# Patient Record
Sex: FEMALE | Race: WHITE | NOT HISPANIC OR LATINO | Employment: FULL TIME | URBAN - METROPOLITAN AREA
[De-identification: names, ages, dates, MRNs, and addresses within clinical notes are randomized per-mention and may not be internally consistent; named-entity substitution may affect disease eponyms.]

---

## 2017-02-09 ENCOUNTER — OFFICE VISIT (OUTPATIENT)
Dept: ENDOCRINOLOGY | Age: 42
End: 2017-02-09

## 2017-02-09 VITALS
OXYGEN SATURATION: 98 % | WEIGHT: 198.2 LBS | HEIGHT: 68 IN | HEART RATE: 116 BPM | SYSTOLIC BLOOD PRESSURE: 126 MMHG | BODY MASS INDEX: 30.04 KG/M2 | DIASTOLIC BLOOD PRESSURE: 76 MMHG

## 2017-02-09 DIAGNOSIS — Z46.81 INSULIN PUMP TITRATION: ICD-10-CM

## 2017-02-09 DIAGNOSIS — IMO0002 DIABETES MELLITUS TYPE 1, UNCONTROLLED, WITH COMPLICATIONS: Primary | ICD-10-CM

## 2017-02-09 DIAGNOSIS — R63.5 ABNORMAL WEIGHT GAIN: ICD-10-CM

## 2017-02-09 DIAGNOSIS — E16.1 HYPERINSULINISM: ICD-10-CM

## 2017-02-09 PROBLEM — O24.919 DIABETES MELLITUS DURING PREGNANCY, ANTEPARTUM: Status: ACTIVE | Noted: 2017-02-09

## 2017-02-09 PROBLEM — E03.9 HYPOTHYROIDISM: Status: ACTIVE | Noted: 2017-02-09

## 2017-02-09 PROBLEM — E10.9 TYPE 1 DIABETES MELLITUS (HCC): Status: ACTIVE | Noted: 2017-02-09

## 2017-02-09 PROCEDURE — 99214 OFFICE O/P EST MOD 30 MIN: CPT | Performed by: INTERNAL MEDICINE

## 2017-02-09 NOTE — PROGRESS NOTES
41 y.o.    Patient Care Team:  Ray Barnes DO as PCP - General    Chief Complaint:      F/U TYPE 1 DIABETES, UNCONTROLLED.  HYPOTHYROIDISM.  NO RECENT LABS  Subjective     HPI   Patient is a 41-year-old white female with a past history of uncontrolled type 1 diabetes mellitus came for followup. After nearly 2 years.  Patient is currently using Medtronic insulin pump.  She uses NovoLog insulin.  Her blood sugars are fluctuating from  in the past few months.  Hypoglycemia.  Patient had several episodes of hypoglycemia.  She was unable to lose the CGM sensor effectively.  Hypothyroidism.  Patient is currently not on any medication. She was briefly on Synthroid during pregnancy    Interval History       Patient reports that at the sugars are fluctuating somewhere between . With an occasional high blood sugar. Since starting the sensor she believes that she has done very well able to monitor her blood sugars on the pump and adjust insulin.  she has not been using sensor for the past 2-3 months.  Hypoglycemia she has had a few episodes of hypoglycemia more so in the early morning hours and late-night.        Patient is currently using NovoLog was insulin pump  Her recent hemoglobin A1c was 7%     hypothyroidism  Patient reported that she is currently not taking Synthroid since discharge. She's not sure if she was given the medication during her recent hospitalization either.   patient reports that she managed to lose a little bit of weight. She is trying hard to lose weight. Her children are 18 months and 4 and half years old now         The following portions of the patient's history were reviewed and updated as appropriate: allergies, current medications, past family history, past medical history, past social history, past surgical history and problem list.    Past Medical History   Diagnosis Date   • Diabetes mellitus    • Hypoglycemia    • Insulin pump status    • Thyroiditis    • Type 1 diabetes  "mellitus      History reviewed. No pertinent family history.  Social History     Social History   • Marital status:      Spouse name: N/A   • Number of children: N/A   • Years of education: N/A     Occupational History   • Not on file.     Social History Main Topics   • Smoking status: Current Every Day Smoker   • Smokeless tobacco: Not on file   • Alcohol use Yes      Comment: social   • Drug use: Not on file   • Sexual activity: Not on file     Other Topics Concern   • Not on file     Social History Narrative     No Known Allergies    Current Outpatient Prescriptions:   •  PRATIMA CONTOUR NEXT TEST test strip, USE TO TEST 10 TIMES DAILY, Disp: 300 each, Rfl: 6  •  Blood Glucose Monitoring Suppl (PRATIMA CONTOUR NEXT EZ) W/DEVICE kit, , Disp: , Rfl:   •  escitalopram (LEXAPRO) 20 MG tablet, Take 20 mg by mouth., Disp: , Rfl:   •  insulin aspart (NOVOLOG) 100 UNIT/ML injection, Inject 120 Units under the skin Continuous. VIA INSULIN PUMP, Disp: 4 each, Rfl: 4        Review of Systems   Constitutional: Negative for chills, fatigue and fever.   Cardiovascular: Negative for chest pain and palpitations.   Gastrointestinal: Negative for abdominal pain, constipation, diarrhea, nausea and vomiting.   Endocrine: Negative for cold intolerance and heat intolerance.   All other systems reviewed and are negative.      Objective       Vitals:    02/09/17 1449   BP: 126/76   Pulse: 116   SpO2: 98%   Weight: 198 lb 3.2 oz (89.9 kg)   Height: 68\" (172.7 cm)     Body mass index is 30.14 kg/(m^2).      Physical Exam   Constitutional: She is oriented to person, place, and time. She appears well-developed and well-nourished.   HENT:   Head: Normocephalic and atraumatic.   Eyes: EOM are normal. Pupils are equal, round, and reactive to light.   Neck: Normal range of motion. Neck supple. No thyromegaly present.   Cardiovascular: Normal rate, regular rhythm, normal heart sounds and intact distal pulses.    Pulmonary/Chest: Effort normal " and breath sounds normal.   Abdominal: Soft. Bowel sounds are normal. She exhibits distension. There is no tenderness.   Musculoskeletal: Normal range of motion. She exhibits no edema.   Neurological: She is alert and oriented to person, place, and time.   Skin: Skin is warm and dry.   Nursing note and vitals reviewed.    Results Review:     I reviewed the patient's new clinical results.    Medical records reviewed  Summary:      Conversion Encounter on 06/30/2015   Component Date Value Ref Range Status   • Glucose 06/30/2015 400* 65 - 99 mg/dL Final   • BUN 06/30/2015 11  6 - 20 mg/dL Final   • Creatinine 06/30/2015 0.72  0.57 - 1.00 mg/dL Final   • eGFR Non African Am 06/30/2015 >60  mL/min/1.732 Final    Comment: GFR Normal                            >60  Chronic Kidney Disease          <60  Kidney Failure                         <15     • eGFR  Am 06/30/2015 >60  mL/min/1.732 Final    Comment: GFR Normal                            >60  Chronic Kidney Disease          <60  Kidney Failure                         <15     • BUN/Creatinine Ratio 06/30/2015 15   Final   • Sodium 06/30/2015 139  136 - 145 mmol/L Final   • Potassium 06/30/2015 5.0  3.5 - 5.2 mmol/L Final   • Chloride 06/30/2015 102  98 - 107 mmol/L Final   • Total CO2 06/30/2015 25  22 - 29 mmol/L Final   • Calcium 06/30/2015 9.0  8.6 - 10.5 mg/dL Final   • Total Protein 06/30/2015 7.0  6.0 - 8.5 g/dL Final   • Albumin 06/30/2015 4.4  3.5 - 5.2 g/dL Final   • Globulin 06/30/2015 2.6  g/dL Final   • A/G Ratio 06/30/2015 1.7   Final   • Total Bilirubin 06/30/2015 0.3  0.1 - 1.2 mg/dL Final   • Alkaline Phosphatase 06/30/2015 64  39 - 117 U/L Final   • AST (SGOT) 06/30/2015 13  5 - 32 U/L Final   • ALT (SGPT) 06/30/2015 12  5 - 33 U/L Final   • Creatinine, Urine 06/30/2015 115.0  16.0 - 327.0 mg/dL Final   • Microalbumin, Urine 06/30/2015 3.2  0.0 - 17.0 ug/mL Final   • Microalbumin/Creatinine Ratio Urine 06/30/2015 2.8  0.0 - 30.0 mg/g creat Final    • Hemoglobin A1C 06/30/2015 8.4* 4.8 - 5.6 % Final    Comment:    Hemoglobin A1C Ranges:     Increased Risk for Diabetes    5.7% to 6.4%     Diabetes                                 >=  6.5%     Diabetic Goal                          < 7.0%     • Free T4 06/30/2015 1.15  0.93 - 1.70 ng/dL Final   • TSH 06/30/2015 2.60  0.27 - 4.20 uIU/mL Final     Lab Results   Component Value Date    HGBA1C 8.4 (H) 06/30/2015     Lab Results   Component Value Date    MICROALBUR 3.2 06/30/2015    CREATININE 0.72 06/30/2015     Imaging Results (most recent)     None                Assessment and Plan:    Meredith was seen today for hypothyroidism and diabetes.    Diagnoses and all orders for this visit:    Diabetes mellitus type 1, uncontrolled, with complications  -     Comprehensive Metabolic Panel  -     T4, Free  -     TSH  -     Microalbumin / Creatinine Urine Ratio    Hyperinsulinism  -     Comprehensive Metabolic Panel  -     T4, Free  -     TSH  -     Microalbumin / Creatinine Urine Ratio    Insulin pump titration  -     Comprehensive Metabolic Panel  -     T4, Free  -     TSH  -     Microalbumin / Creatinine Urine Ratio    Abnormal weight gain  -     Comprehensive Metabolic Panel  -     T4, Free  -     TSH  -     Microalbumin / Creatinine Urine Ratio      #1 uncontrolled type 1 diabetes mellitus. Patient is using NovoLog via Medtronic pump. Blood sugars are fluctuating between  range.   Pump download reviewed with the patient.     Most blood sugars are ranging from .  Both the basal and bolus rates adjusted to get the blood sugars more streamlined.  Also to prevent hypoglycemia  #2 hypoglycemia: A few episodes in the 50 range at different times of the day- but mostly in the early morning hours and late-night.  #3 insulin pump management;basal rates were changed as well as bolus rates.  #4 abnormal weight gain  She managed to gain nearly 15 lbs  #5 history of primary hypothyroidism. Currently not on  medication     Patient will relapse testing done today.  I encouraged her to consider her diet and weight loss and exercise.  She'll return to follow up in 3 months    The total time spent for old record and lab review and face- to- face was more than 25 min of which greater than 50% of time was spent on counseling the patient on recommended evaluation and treatment options, instructions for management/treatment and /or follow up  and importance of compliance with chosen management or treatment options    Servando Pablo MD. FACE    02/09/17      EMR Dragon / transcription disclaimer:    Much of this encounter note is an electronic transcription/ translation of spoken language to printed text.  Electronic translation of spoken language may permit erroneous, or at times, nonsensical words or phrases to be inadvertently transcribed; although I have reviewed the note for such errors, some may still exist.

## 2017-02-10 LAB
ALBUMIN SERPL-MCNC: 4.3 G/DL (ref 3.5–5.2)
ALBUMIN/CREAT UR: <3.6 MG/G CREAT (ref 0–30)
ALBUMIN/GLOB SERPL: 1.5 G/DL
ALP SERPL-CCNC: 67 U/L (ref 39–117)
ALT SERPL-CCNC: 13 U/L (ref 1–33)
AST SERPL-CCNC: 14 U/L (ref 1–32)
BILIRUB SERPL-MCNC: 0.2 MG/DL (ref 0.1–1.2)
BUN SERPL-MCNC: 13 MG/DL (ref 6–20)
BUN/CREAT SERPL: 16.9 (ref 7–25)
CALCIUM SERPL-MCNC: 9.5 MG/DL (ref 8.6–10.5)
CHLORIDE SERPL-SCNC: 100 MMOL/L (ref 98–107)
CO2 SERPL-SCNC: 27.9 MMOL/L (ref 22–29)
CREAT SERPL-MCNC: 0.77 MG/DL (ref 0.57–1)
CREAT UR-MCNC: 82.8 MG/DL
GLOBULIN SER CALC-MCNC: 2.8 GM/DL
GLUCOSE SERPL-MCNC: 282 MG/DL (ref 65–99)
MICROALBUMIN UR-MCNC: <3 UG/ML
POTASSIUM SERPL-SCNC: 4.4 MMOL/L (ref 3.5–5.2)
PROT SERPL-MCNC: 7.1 G/DL (ref 6–8.5)
SODIUM SERPL-SCNC: 139 MMOL/L (ref 136–145)
T4 FREE SERPL-MCNC: 0.97 NG/DL (ref 0.93–1.7)
TSH SERPL DL<=0.005 MIU/L-ACNC: 2.58 MIU/ML (ref 0.27–4.2)

## 2017-07-07 ENCOUNTER — OFFICE VISIT (OUTPATIENT)
Dept: ENDOCRINOLOGY | Age: 42
End: 2017-07-07

## 2017-07-07 VITALS
HEIGHT: 68 IN | SYSTOLIC BLOOD PRESSURE: 132 MMHG | HEART RATE: 82 BPM | OXYGEN SATURATION: 98 % | WEIGHT: 199.2 LBS | DIASTOLIC BLOOD PRESSURE: 92 MMHG | BODY MASS INDEX: 30.19 KG/M2

## 2017-07-07 DIAGNOSIS — E16.1 HYPERINSULINISM: ICD-10-CM

## 2017-07-07 DIAGNOSIS — E03.9 HYPOTHYROIDISM, UNSPECIFIED TYPE: ICD-10-CM

## 2017-07-07 DIAGNOSIS — R63.5 ABNORMAL WEIGHT GAIN: ICD-10-CM

## 2017-07-07 DIAGNOSIS — Z46.81 INSULIN PUMP TITRATION: ICD-10-CM

## 2017-07-07 DIAGNOSIS — IMO0002 DIABETES MELLITUS TYPE 1, UNCONTROLLED, WITH COMPLICATIONS: Primary | ICD-10-CM

## 2017-07-07 PROCEDURE — 99214 OFFICE O/P EST MOD 30 MIN: CPT | Performed by: INTERNAL MEDICINE

## 2017-07-07 NOTE — PROGRESS NOTES
41 y.o.    Patient Care Team:  Ray Barnes DO as PCP - General    Chief Complaint:      F/U TYPE 1 DIABETES, UNCONTROLLED.  HYPOTHYROIDISM.  NO RECENT LABS  Subjective     HPI patient is a 41-year-old white female with a past history of uncontrolled type 1 diabetes mellitus came for follow-up.  Patient is currently using Medtronic pump  Blood sugars are fluctuating from   She reports a lot of stress at home and has not been able to focus on her diabetes management  Hypoglycemia  Patient had several episodes of hypoglycemia in the past several months.  She's currently not using the CGM  Hypothyroidism  Patient is currently not on medication.  She was on Synthroid briefly during her pregnancy  Patient denies any symptoms suggestive of diabetic neuropathy or retinopathy or nephropathy.  Abnormal weight gain  Patient currently weighs 199 pounds with a BMI of 30.3       Interval History         Patient reports that at the sugars are fluctuating somewhere between . With an occasional high blood sugar. Since starting the sensor she believes that she has done very well able to monitor her blood sugars on the pump and adjust insulin.  she has not been using sensor for the past 2-3 months.  Hypoglycemia she has had a few episodes of hypoglycemia more so in the early morning hours and late-night.          Patient is currently using NovoLog was insulin pump  Her recent hemoglobin A1c was 7%      hypothyroidism  Patient reported that she is currently not taking Synthroid since discharge. She's not sure if she was given the medication during her recent hospitalization either.   patient reports that she managed to lose a little bit of weight. She is trying hard to lose weight. Her children are 18 months and 4 and half years old now   The following portions of the patient's history were reviewed and updated as appropriate: allergies, current medications, past family history, past medical history, past social  "history, past surgical history and problem list.    Past Medical History:   Diagnosis Date   • Diabetes mellitus    • Hypoglycemia    • Insulin pump status    • Thyroiditis    • Type 1 diabetes mellitus      No family history on file.  Social History     Social History   • Marital status:      Spouse name: N/A   • Number of children: N/A   • Years of education: N/A     Occupational History   • Not on file.     Social History Main Topics   • Smoking status: Current Every Day Smoker   • Smokeless tobacco: Not on file   • Alcohol use Yes      Comment: social   • Drug use: Not on file   • Sexual activity: Not on file     Other Topics Concern   • Not on file     Social History Narrative     No Known Allergies    Current Outpatient Prescriptions:   •  PRATIMA CONTOUR NEXT TEST test strip, USE TO TEST 10 TIMES DAILY, Disp: 300 each, Rfl: 6  •  Blood Glucose Monitoring Suppl (PRATIMA CONTOUR NEXT EZ) W/DEVICE kit, , Disp: , Rfl:   •  escitalopram (LEXAPRO) 20 MG tablet, Take 20 mg by mouth., Disp: , Rfl:   •  insulin aspart (NOVOLOG) 100 UNIT/ML injection, Inject 120 Units under the skin Continuous. VIA INSULIN PUMP, Disp: 4 each, Rfl: 4        Review of Systems   Constitutional: Negative for chills, fatigue and fever.   Cardiovascular: Negative for chest pain and palpitations.   Gastrointestinal: Negative for abdominal pain, constipation, diarrhea, nausea and vomiting.   Endocrine: Negative for cold intolerance and heat intolerance.   All other systems reviewed and are negative.      Objective       Vitals:    07/07/17 0957   BP: 132/92   Pulse: 82   SpO2: 98%   Weight: 199 lb 3.2 oz (90.4 kg)   Height: 68\" (172.7 cm)     Body mass index is 30.29 kg/(m^2).      Physical Exam   Constitutional: She is oriented to person, place, and time. She appears well-developed and well-nourished.   HENT:   Head: Normocephalic and atraumatic.   Eyes: EOM are normal. Pupils are equal, round, and reactive to light.   Neck: Normal range " of motion. Neck supple. No thyromegaly present.   Cardiovascular: Normal rate, regular rhythm, normal heart sounds and intact distal pulses.    Pulmonary/Chest: Effort normal and breath sounds normal.   Abdominal: Soft. Bowel sounds are normal. She exhibits distension. There is no tenderness.   Musculoskeletal: Normal range of motion. She exhibits no edema.   Neurological: She is alert and oriented to person, place, and time.   Skin: Skin is warm and dry.   Nursing note and vitals reviewed.    Results Review:     I reviewed the patient's new clinical results.    Medical records reviewed  Summary:      Office Visit on 02/09/2017   Component Date Value Ref Range Status   • Glucose 02/09/2017 282* 65 - 99 mg/dL Final   • BUN 02/09/2017 13  6 - 20 mg/dL Final   • Creatinine 02/09/2017 0.77  0.57 - 1.00 mg/dL Final   • eGFR Non  Am 02/09/2017 83  >60 mL/min/1.73 Final   • eGFR African Am 02/09/2017 100  >60 mL/min/1.73 Final   • BUN/Creatinine Ratio 02/09/2017 16.9  7.0 - 25.0 Final   • Sodium 02/09/2017 139  136 - 145 mmol/L Final   • Potassium 02/09/2017 4.4  3.5 - 5.2 mmol/L Final   • Chloride 02/09/2017 100  98 - 107 mmol/L Final   • Total CO2 02/09/2017 27.9  22.0 - 29.0 mmol/L Final   • Calcium 02/09/2017 9.5  8.6 - 10.5 mg/dL Final   • Total Protein 02/09/2017 7.1  6.0 - 8.5 g/dL Final   • Albumin 02/09/2017 4.30  3.50 - 5.20 g/dL Final   • Globulin 02/09/2017 2.8  gm/dL Final   • A/G Ratio 02/09/2017 1.5  g/dL Final   • Total Bilirubin 02/09/2017 0.2  0.1 - 1.2 mg/dL Final   • Alkaline Phosphatase 02/09/2017 67  39 - 117 U/L Final   • AST (SGOT) 02/09/2017 14  1 - 32 U/L Final   • ALT (SGPT) 02/09/2017 13  1 - 33 U/L Final   • Free T4 02/09/2017 0.97  0.93 - 1.70 ng/dL Final   • TSH 02/09/2017 2.580  0.270 - 4.200 mIU/mL Final   • Creatinine, Urine 02/09/2017 82.8  Not Estab. mg/dL Final   • Microalbumin, Urine 02/09/2017 <3.0  Not Estab. ug/mL Final   • Microalbumin/Creatinine Ratio Urine 02/09/2017 <3.6   0.0 - 30.0 mg/g creat Final     Lab Results   Component Value Date    HGBA1C 8.4 (H) 06/30/2015     Lab Results   Component Value Date    MICROALBUR <3.0 02/09/2017    CREATININE 0.77 02/09/2017     Imaging Results (most recent)     None                Assessment and Plan:    Meredith was seen today for hypothyroidism and diabetes.    Diagnoses and all orders for this visit:    Diabetes mellitus type 1, uncontrolled, with complications  -     Comprehensive Metabolic Panel  -     Hemoglobin A1c  -     T4, Free  -     TSH  -     Microalbumin / Creatinine Urine Ratio    Hypothyroidism, unspecified type  -     Comprehensive Metabolic Panel  -     Hemoglobin A1c  -     T4, Free  -     TSH  -     Microalbumin / Creatinine Urine Ratio    Insulin pump titration    Abnormal weight gain    Hyperinsulinism    Other orders  -     insulin aspart (NOVOLOG) 100 UNIT/ML injection; Inject 120 Units under the skin Continuous. VIA INSULIN PUMP           #1 uncontrolled type 1 diabetes mellitus.Also to prevent hypoglycemia  #2 hypoglycemia:   #3 insulin pump management;basal rates were changed as well as bolus rates.  #4 abnormal weight gain    #5 history of primary hypothyroidism. Currently not on medication      Pump download reviewed with the patient  Patient is using mostly manual boluses for correction as well as mealtime  Her blood sugars are in the 150-350 range  No episodes of hypoglycemia noted so far in the past one month    Patient appears to have lost motivation and she's not focusing on carb counting and mealtime boluses  Patient will get lab testing done today  I encouraged her to consider writing down blood sugars and using log sheets once again  I also encouraged her to consider an upgrade to 670 G with CGM    Patient will return to follow-up in 3 months.  The total time spent for old record and lab review and face- to- face was more than 25 min of which greater than 15 min of time was spent on counseling the patient on  recommended evaluation and treatment options, instructions for management/treatment and /or follow up  and importance of compliance with chosen management or treatment options       Servando Pablo MD. FACE    07/07/17

## 2017-07-08 LAB
ALBUMIN SERPL-MCNC: 4.7 G/DL (ref 3.5–5.2)
ALBUMIN/CREAT UR: <32.6 MG/G CREAT (ref 0–30)
ALBUMIN/GLOB SERPL: 1.6 G/DL
ALP SERPL-CCNC: 73 U/L (ref 39–117)
ALT SERPL-CCNC: 23 U/L (ref 1–33)
AST SERPL-CCNC: 21 U/L (ref 1–32)
BILIRUB SERPL-MCNC: 0.3 MG/DL (ref 0.1–1.2)
BUN SERPL-MCNC: 9 MG/DL (ref 6–20)
BUN/CREAT SERPL: 11.5 (ref 7–25)
CALCIUM SERPL-MCNC: 9.8 MG/DL (ref 8.6–10.5)
CHLORIDE SERPL-SCNC: 101 MMOL/L (ref 98–107)
CO2 SERPL-SCNC: 28.1 MMOL/L (ref 22–29)
CREAT SERPL-MCNC: 0.78 MG/DL (ref 0.57–1)
CREAT UR-MCNC: 9.2 MG/DL
GLOBULIN SER CALC-MCNC: 3 GM/DL
GLUCOSE SERPL-MCNC: 118 MG/DL (ref 65–99)
HBA1C MFR BLD: 9.7 % (ref 4.8–5.6)
MICROALBUMIN UR-MCNC: <3 UG/ML
POTASSIUM SERPL-SCNC: 4 MMOL/L (ref 3.5–5.2)
PROT SERPL-MCNC: 7.7 G/DL (ref 6–8.5)
SODIUM SERPL-SCNC: 143 MMOL/L (ref 136–145)
T4 FREE SERPL-MCNC: 1.2 NG/DL (ref 0.93–1.7)
TSH SERPL DL<=0.005 MIU/L-ACNC: 2.72 MIU/ML (ref 0.27–4.2)

## 2017-10-16 ENCOUNTER — OFFICE VISIT (OUTPATIENT)
Dept: ENDOCRINOLOGY | Age: 42
End: 2017-10-16

## 2017-10-16 VITALS
HEIGHT: 68 IN | BODY MASS INDEX: 30.65 KG/M2 | HEART RATE: 100 BPM | SYSTOLIC BLOOD PRESSURE: 118 MMHG | DIASTOLIC BLOOD PRESSURE: 74 MMHG | WEIGHT: 202.2 LBS

## 2017-10-16 DIAGNOSIS — E16.1 HYPERINSULINISM: ICD-10-CM

## 2017-10-16 DIAGNOSIS — E03.9 HYPOTHYROIDISM, UNSPECIFIED TYPE: ICD-10-CM

## 2017-10-16 DIAGNOSIS — R63.5 ABNORMAL WEIGHT GAIN: ICD-10-CM

## 2017-10-16 DIAGNOSIS — Z46.81 INSULIN PUMP TITRATION: ICD-10-CM

## 2017-10-16 DIAGNOSIS — IMO0002 DIABETES MELLITUS TYPE 1, UNCONTROLLED, WITH COMPLICATIONS: Primary | ICD-10-CM

## 2017-10-16 PROCEDURE — 99214 OFFICE O/P EST MOD 30 MIN: CPT | Performed by: INTERNAL MEDICINE

## 2017-10-16 NOTE — PROGRESS NOTES
42 y.o.    Patient Care Team:  Ray Barnes DO as PCP - General    Chief Complaint:      F/U TYPE 1 DIABETES, UNCONTROLLED.  NO RECENT LABS.    Subjective     HPI   \Patient is a 42-year-old white female with a past history of uncontrolled type 1 diabetes mellitus came for follow-up  Patient is currently using Medtronic pump  She uses NovoLog via the pump  She reports that most of the blood sugars are in the 100-400 range  She continues to report a lot of stress at home and has not been able to focus on diabetes management but she has at least tried to check more often in the past one month  Hypoglycemia  Patient has not had any hypoglycemia in the past few months  Hypothyroidism  Patient has history of hypothyroidism and is currently not on medication.  She used to be on medication during her pregnancy  Abnormal weight gain  Patient currently weighs 202 pounds with a BMI of 30.8.    Patient denies any symptoms suggestive of diabetic neuropathy or retinopathy or nephropathy.       Interval History          Patient reports that at the sugars are fluctuating somewhere between . With an occasional high blood sugar. Since starting the sensor she believes that she has done very well able to monitor her blood sugars on the pump and adjust insulin.  she has not been using sensor for the past 2-3 months.  Hypoglycemia she has had a few episodes of hypoglycemia more so in the early morning hours and late-night.          Patient is currently using NovoLog was insulin pump  Her recent hemoglobin A1c was 7%      hypothyroidism  Patient reported that she is currently not taking Synthroid since discharge. She's not sure if she was given the medication during her recent hospitalization either.   patient reports that she managed to lose a little bit of weight. She is trying hard to lose weight. Her children are 18 months and 4 and half years old now   The following portions of the patient's history were reviewed and  "updated as appropriate: allergies, current medications, past family history, past medical history, past social history, past surgical history and problem list.    Past Medical History:   Diagnosis Date   • Diabetes mellitus    • Hypoglycemia    • Insulin pump status    • Thyroiditis    • Type 1 diabetes mellitus      History reviewed. No pertinent family history.  Social History     Social History   • Marital status:      Spouse name: N/A   • Number of children: N/A   • Years of education: N/A     Occupational History   • Not on file.     Social History Main Topics   • Smoking status: Current Every Day Smoker   • Smokeless tobacco: Not on file   • Alcohol use Yes      Comment: social   • Drug use: Not on file   • Sexual activity: Not on file     Other Topics Concern   • Not on file     Social History Narrative     No Known Allergies    Current Outpatient Prescriptions:   •  PRATIMA CONTOUR NEXT TEST test strip, USE TO TEST 10 TIMES DAILY, Disp: 300 each, Rfl: 5  •  Blood Glucose Monitoring Suppl (PRATIMA CONTOUR NEXT EZ) W/DEVICE kit, , Disp: , Rfl:   •  escitalopram (LEXAPRO) 20 MG tablet, Take 20 mg by mouth., Disp: , Rfl:   •  insulin aspart (NOVOLOG) 100 UNIT/ML injection, Inject 120 Units under the skin Continuous. VIA INSULIN PUMP, Disp: 4 each, Rfl: 4        Review of Systems   Constitutional: Negative for chills, fatigue and fever.   Cardiovascular: Negative for chest pain and palpitations.   Gastrointestinal: Negative for abdominal pain, constipation, diarrhea and vomiting.   Endocrine: Negative for cold intolerance and heat intolerance.   All other systems reviewed and are negative.      Objective       Vitals:    10/16/17 1605   BP: 118/74   Pulse: 100   Weight: 202 lb 3.2 oz (91.7 kg)   Height: 68\" (172.7 cm)     Body mass index is 30.74 kg/(m^2).      Physical Exam   Constitutional: She is oriented to person, place, and time. She appears well-developed and well-nourished.   HENT:   Head: " Normocephalic and atraumatic.   Eyes: EOM are normal. Pupils are equal, round, and reactive to light.   Neck: Normal range of motion. Neck supple. No thyromegaly present.   Cardiovascular: Normal rate, regular rhythm, normal heart sounds and intact distal pulses.    Pulmonary/Chest: Effort normal and breath sounds normal.   Abdominal: Soft. Bowel sounds are normal. She exhibits distension. There is no tenderness.   Musculoskeletal: Normal range of motion. She exhibits no edema.   Neurological: She is alert and oriented to person, place, and time.   Skin: Skin is warm and dry.   Nursing note and vitals reviewed.    Results Review:     I reviewed the patient's new clinical results.    Medical records reviewed  Summary:      Office Visit on 07/07/2017   Component Date Value Ref Range Status   • Glucose 07/07/2017 118* 65 - 99 mg/dL Final   • BUN 07/07/2017 9  6 - 20 mg/dL Final   • Creatinine 07/07/2017 0.78  0.57 - 1.00 mg/dL Final   • eGFR Non  Am 07/07/2017 81  >60 mL/min/1.73 Final   • eGFR African Am 07/07/2017 99  >60 mL/min/1.73 Final   • BUN/Creatinine Ratio 07/07/2017 11.5  7.0 - 25.0 Final   • Sodium 07/07/2017 143  136 - 145 mmol/L Final   • Potassium 07/07/2017 4.0  3.5 - 5.2 mmol/L Final   • Chloride 07/07/2017 101  98 - 107 mmol/L Final   • Total CO2 07/07/2017 28.1  22.0 - 29.0 mmol/L Final   • Calcium 07/07/2017 9.8  8.6 - 10.5 mg/dL Final   • Total Protein 07/07/2017 7.7  6.0 - 8.5 g/dL Final   • Albumin 07/07/2017 4.70  3.50 - 5.20 g/dL Final   • Globulin 07/07/2017 3.0  gm/dL Final   • A/G Ratio 07/07/2017 1.6  g/dL Final   • Total Bilirubin 07/07/2017 0.3  0.1 - 1.2 mg/dL Final   • Alkaline Phosphatase 07/07/2017 73  39 - 117 U/L Final   • AST (SGOT) 07/07/2017 21  1 - 32 U/L Final   • ALT (SGPT) 07/07/2017 23  1 - 33 U/L Final   • Hemoglobin A1C 07/07/2017 9.70* 4.80 - 5.60 % Final    Comment: Hemoglobin A1C Ranges:  Increased Risk for Diabetes  5.7% to 6.4%  Diabetes                     >=  6.5%  Diabetic Goal                < 7.0%     • Free T4 07/07/2017 1.20  0.93 - 1.70 ng/dL Final   • TSH 07/07/2017 2.720  0.270 - 4.200 mIU/mL Final   • Creatinine, Urine 07/07/2017 9.2  Not Estab. mg/dL Final   • Microalbumin, Urine 07/07/2017 <3.0  Not Estab. ug/mL Final   • Microalbumin/Creatinine Ratio Urine 07/07/2017 <32.6* 0.0 - 30.0 mg/g creat Final     Lab Results   Component Value Date    HGBA1C 9.70 (H) 07/07/2017    HGBA1C 8.4 (H) 06/30/2015     Lab Results   Component Value Date    MICROALBUR <3.0 07/07/2017    CREATININE 0.78 07/07/2017     Imaging Results (most recent)     None                Assessment and Plan:    Meredith was seen today for diabetes.    Diagnoses and all orders for this visit:    Diabetes mellitus type 1, uncontrolled, with complications  -     Comprehensive Metabolic Panel  -     Hemoglobin A1c  -     T4, Free  -     TSH  -     Microalbumin / Creatinine Urine Ratio - Urine, Clean Catch    Hypothyroidism, unspecified type  -     Comprehensive Metabolic Panel  -     Hemoglobin A1c  -     T4, Free  -     TSH  -     Microalbumin / Creatinine Urine Ratio - Urine, Clean Catch    Hyperinsulinism    Insulin pump titration    Abnormal weight gain         #1 uncontrolled type 1 diabetes mellitus.Also to prevent hypoglycemia  #2 hypoglycemia:   #3 insulin pump management;basal rates were changed as well as bolus rates.  #4 abnormal weight gain     #5 history of primary hypothyroidism. Currently not on medication      Pump download reviewed  Most of the blood sugars are in the 150-350 range  No recent hypoglycemia noted   basal rates increased as well as bolus at breakfast    Patient is receiving her Medtronic 670 G pump in a month  Patient will get lab testing done today  Patient will return to follow-up in January as scheduled    The total time spent for old record and lab review and face- to- face was more than 25 min of which greater than 15 min of time was spent on counseling the patient on  "recommended evaluation and treatment options, instructions for management/treatment and /or follow up  and importance of compliance with chosen management or treatment options  Servando Pablo MD. FACE    10/16/17      EMR Dragon / transcription disclaimer:     \"Dictated utilizing Dragon dictation\".         "

## 2017-10-17 LAB
ALBUMIN SERPL-MCNC: 4.2 G/DL (ref 3.5–5.2)
ALBUMIN/CREAT UR: 3.4 MG/G CREAT (ref 0–30)
ALBUMIN/GLOB SERPL: 1.4 G/DL
ALP SERPL-CCNC: 71 U/L (ref 39–117)
ALT SERPL-CCNC: 12 U/L (ref 1–33)
AST SERPL-CCNC: 15 U/L (ref 1–32)
BILIRUB SERPL-MCNC: 0.2 MG/DL (ref 0.1–1.2)
BUN SERPL-MCNC: 13 MG/DL (ref 6–20)
BUN/CREAT SERPL: 15.9 (ref 7–25)
CALCIUM SERPL-MCNC: 9.2 MG/DL (ref 8.6–10.5)
CHLORIDE SERPL-SCNC: 103 MMOL/L (ref 98–107)
CO2 SERPL-SCNC: 25.5 MMOL/L (ref 22–29)
CREAT SERPL-MCNC: 0.82 MG/DL (ref 0.57–1)
CREAT UR-MCNC: 233.4 MG/DL
GLOBULIN SER CALC-MCNC: 3 GM/DL
GLUCOSE SERPL-MCNC: 150 MG/DL (ref 65–99)
HBA1C MFR BLD: 9.4 % (ref 4.8–5.6)
MICROALBUMIN UR-MCNC: 7.9 UG/ML
POTASSIUM SERPL-SCNC: 3.9 MMOL/L (ref 3.5–5.2)
PROT SERPL-MCNC: 7.2 G/DL (ref 6–8.5)
SODIUM SERPL-SCNC: 140 MMOL/L (ref 136–145)
T4 FREE SERPL-MCNC: 1.03 NG/DL (ref 0.93–1.7)
TSH SERPL DL<=0.005 MIU/L-ACNC: 2.37 MIU/ML (ref 0.27–4.2)

## 2018-05-01 ENCOUNTER — OFFICE VISIT (OUTPATIENT)
Dept: ENDOCRINOLOGY | Age: 43
End: 2018-05-01

## 2018-05-01 VITALS
DIASTOLIC BLOOD PRESSURE: 70 MMHG | HEIGHT: 68 IN | BODY MASS INDEX: 31.22 KG/M2 | SYSTOLIC BLOOD PRESSURE: 118 MMHG | WEIGHT: 206 LBS | HEART RATE: 94 BPM

## 2018-05-01 DIAGNOSIS — Z46.81 INSULIN PUMP TITRATION: ICD-10-CM

## 2018-05-01 DIAGNOSIS — IMO0002 DIABETES MELLITUS TYPE 1, UNCONTROLLED, WITH COMPLICATIONS: Primary | ICD-10-CM

## 2018-05-01 DIAGNOSIS — R63.5 ABNORMAL WEIGHT GAIN: ICD-10-CM

## 2018-05-01 DIAGNOSIS — E16.1 HYPERINSULINISM: ICD-10-CM

## 2018-05-01 PROCEDURE — 99214 OFFICE O/P EST MOD 30 MIN: CPT | Performed by: INTERNAL MEDICINE

## 2018-05-01 RX ORDER — LANOLIN ALCOHOL/MO/W.PET/CERES
325 CREAM (GRAM) TOPICAL
COMMUNITY
Start: 2018-04-13 | End: 2019-01-11

## 2018-05-01 RX ORDER — MELOXICAM 15 MG/1
TABLET ORAL
COMMUNITY
Start: 2018-04-13 | End: 2019-01-11

## 2018-05-01 RX ORDER — CYANOCOBALAMIN 1000 UG/ML
1000 INJECTION, SOLUTION INTRAMUSCULAR; SUBCUTANEOUS
COMMUNITY
Start: 2018-05-01 | End: 2018-05-01

## 2018-08-23 DIAGNOSIS — IMO0002 DIABETES MELLITUS TYPE 1, UNCONTROLLED, WITH COMPLICATIONS: Primary | ICD-10-CM

## 2018-10-12 ENCOUNTER — RESULTS ENCOUNTER (OUTPATIENT)
Dept: ENDOCRINOLOGY | Age: 43
End: 2018-10-12

## 2018-10-12 DIAGNOSIS — IMO0002 DIABETES MELLITUS TYPE 1, UNCONTROLLED, WITH COMPLICATIONS: ICD-10-CM

## 2019-01-11 ENCOUNTER — OFFICE VISIT (OUTPATIENT)
Dept: ENDOCRINOLOGY | Age: 44
End: 2019-01-11

## 2019-01-11 VITALS
WEIGHT: 204.4 LBS | DIASTOLIC BLOOD PRESSURE: 82 MMHG | SYSTOLIC BLOOD PRESSURE: 122 MMHG | BODY MASS INDEX: 30.98 KG/M2 | HEIGHT: 68 IN | HEART RATE: 91 BPM

## 2019-01-11 DIAGNOSIS — Z46.81 INSULIN PUMP TITRATION: ICD-10-CM

## 2019-01-11 DIAGNOSIS — R63.5 ABNORMAL WEIGHT GAIN: ICD-10-CM

## 2019-01-11 DIAGNOSIS — E16.1 HYPERINSULINISM: ICD-10-CM

## 2019-01-11 DIAGNOSIS — IMO0002 DIABETES MELLITUS TYPE 1, UNCONTROLLED, WITH COMPLICATIONS: Primary | ICD-10-CM

## 2019-01-11 PROCEDURE — 99214 OFFICE O/P EST MOD 30 MIN: CPT | Performed by: INTERNAL MEDICINE

## 2019-01-11 RX ORDER — BLOOD SUGAR DIAGNOSTIC
STRIP MISCELLANEOUS
Qty: 100 EACH | Refills: 1 | Status: SHIPPED | OUTPATIENT
Start: 2019-01-11

## 2019-01-11 NOTE — PROGRESS NOTES
43 y.o.    Patient Care Team:  Ray Barnes DO as PCP - General    Chief Complaint:      F/U TYPE 1 DIABETES, UNCONTROLLED.  NO RECENT LABS.  Subjective     HPI   Patient is a 43-year-old white female with a past history of uncontrolled type 1 diabetes mellitus came for follow-up    Uncontrolled type 1 diabetes mellitus  Patient is currently using Medtronic 670 G with CGM  She reports that the CGM is costing her a lot of money and is not able to afford it  Without CGM she is not able to use the pump effectively  Her blood sugars are ranging from   Hypoglycemia  Patient has occasional hypoglycemia  She denies any nocturnal hypoglycemia  She denies any symptoms suggestive of diabetic retinopathy or nephropathy or neuropathy  Abnormal weight gain  Patient currently weighs 204 pounds with a BMI of 31  Patient denies any symptoms of hyper or hypothyroidism      Interval History    Patient reports that at the sugars are fluctuating somewhere between . With an occasional high blood sugar. Since starting the sensor she believes that she has done very well able to monitor her blood sugars on the pump and adjust insulin.  she has not been using sensor for the past 2-3 months.  Hypoglycemia she has had a few episodes of hypoglycemia more so in the early morning hours and late-night.          Patient is currently using NovoLog was insulin pump  Her recent hemoglobin A1c was 7%      hypothyroidism  Patient reported that she is currently not taking Synthroid since discharge. She's not sure if she was given the medication during her recent hospitalization either.   patient reports that she managed to lose a little bit of weight. She is trying hard to lose weight. Her children are 18 months and 4 and half years old now         The following portions of the patient's history were reviewed and updated as appropriate: allergies, current medications, past family history, past medical history, past social history, past  surgical history and problem list.    Past Medical History:   Diagnosis Date   • Diabetes mellitus (CMS/HCC)    • Hypoglycemia    • Insulin pump status    • Thyroiditis    • Type 1 diabetes mellitus (CMS/HCC)      No family history on file.  Social History     Socioeconomic History   • Marital status:      Spouse name: Not on file   • Number of children: Not on file   • Years of education: Not on file   • Highest education level: Not on file   Social Needs   • Financial resource strain: Not on file   • Food insecurity - worry: Not on file   • Food insecurity - inability: Not on file   • Transportation needs - medical: Not on file   • Transportation needs - non-medical: Not on file   Occupational History   • Not on file   Tobacco Use   • Smoking status: Current Every Day Smoker   • Smokeless tobacco: Never Used   Substance and Sexual Activity   • Alcohol use: Yes     Comment: social   • Drug use: Not on file   • Sexual activity: Not on file   Other Topics Concern   • Not on file   Social History Narrative   • Not on file     No Known Allergies    Current Outpatient Medications:   •  Blood Glucose Monitoring Suppl (PRATIMA CONTOUR NEXT EZ) W/DEVICE kit, , Disp: , Rfl:   •  escitalopram (LEXAPRO) 20 MG tablet, Take 20 mg by mouth., Disp: , Rfl:   •  ferrous sulfate 325 (65 FE) MG EC tablet, Take 325 mg by mouth., Disp: , Rfl:   •  glucose blood (PRATIMA CONTOUR NEXT TEST) test strip, TO TEST BLOOD SUGARS 10 TIMES DAILY, Disp: 300 each, Rfl: 5  •  insulin aspart (NOVOLOG) 100 UNIT/ML injection, Inject 120 Units under the skin Daily. VIA INSULIN PUMP, Disp: 4 each, Rfl: 4  •  meloxicam (MOBIC) 15 MG tablet, , Disp: , Rfl:         Review of Systems   Constitutional: Negative for chills, fatigue and fever.   Cardiovascular: Negative for chest pain and palpitations.   Gastrointestinal: Negative for abdominal pain, constipation, diarrhea, nausea and vomiting.   Endocrine: Negative for cold intolerance and heat  "intolerance.   All other systems reviewed and are negative.      Objective       Vitals:    01/11/19 1428   BP: 122/82   Pulse: 91   Weight: 92.7 kg (204 lb 6.4 oz)   Height: 172.7 cm (68\")     Body mass index is 31.08 kg/m².      Physical Exam   Constitutional: She is oriented to person, place, and time. She appears well-developed and well-nourished.   HENT:   Head: Normocephalic and atraumatic.   Eyes: EOM are normal. Pupils are equal, round, and reactive to light.   Neck: Normal range of motion. Neck supple. No thyromegaly present.   Cardiovascular: Normal rate, regular rhythm, normal heart sounds and intact distal pulses.   Pulmonary/Chest: Effort normal and breath sounds normal.   Abdominal: Soft. Bowel sounds are normal. She exhibits distension. There is no tenderness.   Musculoskeletal: Normal range of motion. She exhibits no edema.   Neurological: She is alert and oriented to person, place, and time.   Skin: Skin is warm and dry.   Nursing note and vitals reviewed.    Results Review:     I reviewed the patient's new clinical results.    Medical records reviewed  Summary:      Office Visit on 10/16/2017   Component Date Value Ref Range Status   • Glucose 10/16/2017 150* 65 - 99 mg/dL Final   • BUN 10/16/2017 13  6 - 20 mg/dL Final   • Creatinine 10/16/2017 0.82  0.57 - 1.00 mg/dL Final   • eGFR Non  Am 10/16/2017 76  >60 mL/min/1.73 Final   • eGFR African Am 10/16/2017 93  >60 mL/min/1.73 Final   • BUN/Creatinine Ratio 10/16/2017 15.9  7.0 - 25.0 Final   • Sodium 10/16/2017 140  136 - 145 mmol/L Final   • Potassium 10/16/2017 3.9  3.5 - 5.2 mmol/L Final   • Chloride 10/16/2017 103  98 - 107 mmol/L Final   • Total CO2 10/16/2017 25.5  22.0 - 29.0 mmol/L Final   • Calcium 10/16/2017 9.2  8.6 - 10.5 mg/dL Final   • Total Protein 10/16/2017 7.2  6.0 - 8.5 g/dL Final   • Albumin 10/16/2017 4.20  3.50 - 5.20 g/dL Final   • Globulin 10/16/2017 3.0  gm/dL Final   • A/G Ratio 10/16/2017 1.4  g/dL Final   • " "Total Bilirubin 10/16/2017 0.2  0.1 - 1.2 mg/dL Final   • Alkaline Phosphatase 10/16/2017 71  39 - 117 U/L Final   • AST (SGOT) 10/16/2017 15  1 - 32 U/L Final   • ALT (SGPT) 10/16/2017 12  1 - 33 U/L Final   • Hemoglobin A1C 10/16/2017 9.40* 4.80 - 5.60 % Final    Comment: Hemoglobin A1C Ranges:  Increased Risk for Diabetes  5.7% to 6.4%  Diabetes                     >= 6.5%  Diabetic Goal                < 7.0%     • Free T4 10/16/2017 1.03  0.93 - 1.70 ng/dL Final   • TSH 10/16/2017 2.370  0.270 - 4.200 mIU/mL Final   • Creatinine, Urine 10/16/2017 233.4  Not Estab. mg/dL Final   • Microalbumin, Urine 10/16/2017 7.9  Not Estab. ug/mL Final   • Microalbumin/Creatinine Ratio 10/16/2017 3.4  0.0 - 30.0 mg/g creat Final     Lab Results   Component Value Date    HGBA1C 9.40 (H) 10/16/2017    HGBA1C 9.70 (H) 07/07/2017    HGBA1C 8.4 (H) 06/30/2015     Lab Results   Component Value Date    MICROALBUR 7.9 10/16/2017    CREATININE 0.82 10/16/2017     Imaging Results (most recent)     None                                          Assessment and Plan:    Meredith was seen today for diabetes and hypothyroidism.    Diagnoses and all orders for this visit:    Diabetes mellitus type 1, uncontrolled, with complications (CMS/[facility])  -     Comprehensive Metabolic Panel  -     Hemoglobin A1c  -     T4, Free  -     TSH  -     Microalbumin / Creatinine Urine Ratio - Urine, Clean Catch    Insulin pump titration    Abnormal weight gain    Hyperinsulinism    Other orders  -     Insulin Syringe-Needle U-100 (BD INSULIN SYRINGE U/F) 31G X 5/16\" 0.5 ML misc; Use as directed      Glucometer readings reviewed  Patient's blood sugars are 100-300 range    Patient is currently using Medtronic 670 G  Her insurance is not covering CGM fully and is costing her a lot of money every month  She is not using CGM now  As a result her pump is effectively minimized in usage  I advised the patient to discuss with [employer] team to see if there is anyway other " "way of getting sensors covered    Patient will get lab testing done today  She will return to follow-up in 3 months    The total time spent  was more than 25 min of which greater than 15 min of time (greater than 50% of the total time)  was spent face to face with the patient counseling and coordination of care on recommended evaluation and treatment options, instructions for management/treatment and /or follow up and importance of compliance with chosen management or treatment options  Servando Pablo MD. FACE    01/11/19      EMR Dragon / transcription disclaimer:     \"Dictated utilizing Dragon dictation\".         "

## 2019-01-12 LAB
ALBUMIN SERPL-MCNC: 4.2 G/DL (ref 3.5–5.2)
ALBUMIN/CREAT UR: <3.8 MG/G CREAT (ref 0–30)
ALBUMIN/GLOB SERPL: 1.5 G/DL
ALP SERPL-CCNC: 66 U/L (ref 39–117)
ALT SERPL-CCNC: 15 U/L (ref 1–33)
AST SERPL-CCNC: 12 U/L (ref 1–32)
BILIRUB SERPL-MCNC: 0.2 MG/DL (ref 0.1–1.2)
BUN SERPL-MCNC: 10 MG/DL (ref 6–20)
BUN/CREAT SERPL: 11.9 (ref 7–25)
CALCIUM SERPL-MCNC: 9 MG/DL (ref 8.6–10.5)
CHLORIDE SERPL-SCNC: 100 MMOL/L (ref 98–107)
CO2 SERPL-SCNC: 28.1 MMOL/L (ref 22–29)
CREAT SERPL-MCNC: 0.84 MG/DL (ref 0.57–1)
CREAT UR-MCNC: 79.7 MG/DL
GLOBULIN SER CALC-MCNC: 2.8 GM/DL
GLUCOSE SERPL-MCNC: 290 MG/DL (ref 65–99)
HBA1C MFR BLD: 9.82 % (ref 4.8–5.6)
MICROALBUMIN UR-MCNC: <3 UG/ML
POTASSIUM SERPL-SCNC: 3.9 MMOL/L (ref 3.5–5.2)
PROT SERPL-MCNC: 7 G/DL (ref 6–8.5)
SODIUM SERPL-SCNC: 140 MMOL/L (ref 136–145)
T4 FREE SERPL-MCNC: 1.07 NG/DL (ref 0.93–1.7)
TSH SERPL DL<=0.005 MIU/L-ACNC: 2.73 MIU/ML (ref 0.27–4.2)

## 2019-04-12 ENCOUNTER — RESULTS ENCOUNTER (OUTPATIENT)
Dept: ENDOCRINOLOGY | Age: 44
End: 2019-04-12

## 2019-04-12 DIAGNOSIS — IMO0002 DIABETES MELLITUS TYPE 1, UNCONTROLLED, WITH COMPLICATIONS: ICD-10-CM

## 2019-04-12 DIAGNOSIS — IMO0002 DIABETES MELLITUS TYPE 1, UNCONTROLLED, WITH COMPLICATIONS: Primary | ICD-10-CM

## 2019-04-12 DIAGNOSIS — E03.9 HYPOTHYROIDISM, UNSPECIFIED TYPE: ICD-10-CM

## 2019-05-03 RX ORDER — PERPHENAZINE 16 MG/1
TABLET, FILM COATED ORAL
Qty: 300 EACH | Refills: 4 | Status: SHIPPED | OUTPATIENT
Start: 2019-05-03 | End: 2020-04-10 | Stop reason: SDUPTHER

## 2019-12-13 ENCOUNTER — LAB (OUTPATIENT)
Dept: ENDOCRINOLOGY | Age: 44
End: 2019-12-13

## 2019-12-13 DIAGNOSIS — E03.9 HYPOTHYROIDISM, UNSPECIFIED TYPE: ICD-10-CM

## 2019-12-13 DIAGNOSIS — IMO0002 DIABETES MELLITUS TYPE 1, UNCONTROLLED, WITH COMPLICATIONS: Primary | ICD-10-CM

## 2019-12-13 DIAGNOSIS — IMO0002 DIABETES MELLITUS TYPE 1, UNCONTROLLED, WITH COMPLICATIONS: ICD-10-CM

## 2019-12-14 LAB
ALBUMIN SERPL-MCNC: 4.2 G/DL (ref 3.5–5.2)
ALBUMIN/CREAT UR: <4.8 MG/G CREAT (ref 0–30)
ALBUMIN/GLOB SERPL: 1.4 G/DL
ALP SERPL-CCNC: 74 U/L (ref 39–117)
ALT SERPL-CCNC: 12 U/L (ref 1–33)
AST SERPL-CCNC: 14 U/L (ref 1–32)
BILIRUB SERPL-MCNC: 0.2 MG/DL (ref 0.2–1.2)
BUN SERPL-MCNC: 11 MG/DL (ref 6–20)
BUN/CREAT SERPL: 15.3 (ref 7–25)
CALCIUM SERPL-MCNC: 9.2 MG/DL (ref 8.6–10.5)
CHLORIDE SERPL-SCNC: 105 MMOL/L (ref 98–107)
CO2 SERPL-SCNC: 27.6 MMOL/L (ref 22–29)
CREAT SERPL-MCNC: 0.72 MG/DL (ref 0.57–1)
CREAT UR-MCNC: 62.6 MG/DL
GLOBULIN SER CALC-MCNC: 3.1 GM/DL
GLUCOSE SERPL-MCNC: 110 MG/DL (ref 65–99)
HBA1C MFR BLD: 10.7 % (ref 4.8–5.6)
MICROALBUMIN UR-MCNC: <3 UG/ML
POTASSIUM SERPL-SCNC: 4.1 MMOL/L (ref 3.5–5.2)
PROT SERPL-MCNC: 7.3 G/DL (ref 6–8.5)
SODIUM SERPL-SCNC: 142 MMOL/L (ref 136–145)
T4 FREE SERPL-MCNC: 1.19 NG/DL (ref 0.93–1.7)
TSH SERPL DL<=0.005 MIU/L-ACNC: 3.09 UIU/ML (ref 0.27–4.2)

## 2019-12-27 ENCOUNTER — OFFICE VISIT (OUTPATIENT)
Dept: ENDOCRINOLOGY | Age: 44
End: 2019-12-27

## 2019-12-27 VITALS
HEIGHT: 68 IN | BODY MASS INDEX: 29.86 KG/M2 | WEIGHT: 197 LBS | HEART RATE: 93 BPM | DIASTOLIC BLOOD PRESSURE: 68 MMHG | SYSTOLIC BLOOD PRESSURE: 110 MMHG

## 2019-12-27 DIAGNOSIS — R63.5 ABNORMAL WEIGHT GAIN: ICD-10-CM

## 2019-12-27 DIAGNOSIS — E16.1 HYPERINSULINISM: ICD-10-CM

## 2019-12-27 DIAGNOSIS — Z91.199 NONCOMPLIANCE WITH DIABETES TREATMENT: ICD-10-CM

## 2019-12-27 DIAGNOSIS — Z46.81 INSULIN PUMP TITRATION: ICD-10-CM

## 2019-12-27 DIAGNOSIS — IMO0002 DIABETES MELLITUS TYPE 1, UNCONTROLLED, WITH COMPLICATIONS: Primary | ICD-10-CM

## 2019-12-27 PROCEDURE — 99214 OFFICE O/P EST MOD 30 MIN: CPT | Performed by: INTERNAL MEDICINE

## 2019-12-27 NOTE — PROGRESS NOTES
44 y.o.    Patient Care Team:  Ray Barnes DO as PCP - General    Chief Complaint:      F/U TYPE 1 DIABETES, UNCONTROLLED.  HYPOTHYROIDISM.  HERE TO DISCUSS LAB RESULTS.     Subjective     HPI   Patient is a 44-year-old white female with a past history of uncontrolled type 1 diabetes mellitus came for follow-up    Uncontrolled type 1 diabetes mellitus  Patient is currently using Medtronic 670 G  She is not using CGM  She reports that CGM is very expensive for her  Her blood sugars are ranging from 100-400  Hypoglycemia  Patient reports occasional hypoglycemia  She denies any nocturnal hypoglycemia  Patient denies any knowledge of diabetic neuropathy or retinopathy or nephropathy  Abnormal weight gain  Patient currently weighs 197 pounds  She appears to have lost about 7 pounds since last visit in January 2019  Patient currently denies any symptoms of hyper or hypothyroidism      Interval History      The following portions of the patient's history were reviewed and updated as appropriate: allergies, current medications, past family history, past medical history, past social history, past surgical history and problem list.    Past Medical History:   Diagnosis Date   • Diabetes mellitus (CMS/HCC)    • Hypoglycemia    • Insulin pump status    • Thyroiditis    • Type 1 diabetes mellitus (CMS/HCC)      History reviewed. No pertinent family history.  Social History     Socioeconomic History   • Marital status:      Spouse name: Not on file   • Number of children: Not on file   • Years of education: Not on file   • Highest education level: Not on file   Tobacco Use   • Smoking status: Current Every Day Smoker   • Smokeless tobacco: Never Used   Substance and Sexual Activity   • Alcohol use: Yes     Comment: social     No Known Allergies    Current Outpatient Medications:   •  Blood Glucose Monitoring Suppl (PRATIMA CONTOUR NEXT EZ) W/DEVICE kit, , Disp: , Rfl:   •  CONTOUR NEXT TEST test strip, USE TO TEST BLOOD  "SUGARS 10 TIMES A DAY, Disp: 300 each, Rfl: 4  •  escitalopram (LEXAPRO) 20 MG tablet, Take 20 mg by mouth., Disp: , Rfl:   •  Insulin Syringe-Needle U-100 (BD INSULIN SYRINGE U/F) 31G X 5/16\" 0.5 ML misc, Use as directed, Disp: 100 each, Rfl: 1  •  NOVOLOG 100 UNIT/ML injection, INJECT UNDER THE SKIN 120 UNITS DAILY **VIA INSULIN PUMP, Disp: 40 mL, Rfl: 3        Review of Systems   Constitutional: Negative for chills, fatigue and fever.   Cardiovascular: Negative for chest pain and palpitations.   Gastrointestinal: Negative for abdominal pain, constipation, diarrhea, nausea and vomiting.   Endocrine: Negative for cold intolerance and heat intolerance.   All other systems reviewed and are negative.      Objective       Vitals:    12/27/19 1502   BP: 110/68   Pulse: 93   Weight: 89.4 kg (197 lb)   Height: 172.7 cm (68\")     Body mass index is 29.95 kg/m².      Physical Exam   Constitutional: She is oriented to person, place, and time. She appears well-developed and well-nourished.   HENT:   Head: Normocephalic and atraumatic.   Eyes: Pupils are equal, round, and reactive to light. EOM are normal.   Neck: Normal range of motion. Neck supple. No thyromegaly present.   Cardiovascular: Normal rate, regular rhythm, normal heart sounds and intact distal pulses.   Pulmonary/Chest: Effort normal and breath sounds normal.   Abdominal: Soft. Bowel sounds are normal. She exhibits distension. There is no tenderness.   Musculoskeletal: Normal range of motion. She exhibits no edema.   Neurological: She is alert and oriented to person, place, and time.   Skin: Skin is warm and dry.   Nursing note and vitals reviewed.    Results Review:     I reviewed the patient's new clinical results.    Medical records reviewed  Summary:  Ophthalmology notes reviewed  Patient has no evidence for diabetic retinopathy      Results Encounter on 04/12/2019   Component Date Value Ref Range Status   • Glucose 12/13/2019 110* 65 - 99 mg/dL Final   • BUN " 12/13/2019 11  6 - 20 mg/dL Final   • Creatinine 12/13/2019 0.72  0.57 - 1.00 mg/dL Final   • eGFR Non  Am 12/13/2019 88  >60 mL/min/1.73 Final   • eGFR African Am 12/13/2019 107  >60 mL/min/1.73 Final   • BUN/Creatinine Ratio 12/13/2019 15.3  7.0 - 25.0 Final   • Sodium 12/13/2019 142  136 - 145 mmol/L Final   • Potassium 12/13/2019 4.1  3.5 - 5.2 mmol/L Final   • Chloride 12/13/2019 105  98 - 107 mmol/L Final   • Total CO2 12/13/2019 27.6  22.0 - 29.0 mmol/L Final   • Calcium 12/13/2019 9.2  8.6 - 10.5 mg/dL Final   • Total Protein 12/13/2019 7.3  6.0 - 8.5 g/dL Final   • Albumin 12/13/2019 4.20  3.50 - 5.20 g/dL Final   • Globulin 12/13/2019 3.1  gm/dL Final   • A/G Ratio 12/13/2019 1.4  g/dL Final   • Total Bilirubin 12/13/2019 0.2  0.2 - 1.2 mg/dL Final   • Alkaline Phosphatase 12/13/2019 74  39 - 117 U/L Final   • AST (SGOT) 12/13/2019 14  1 - 32 U/L Final   • ALT (SGPT) 12/13/2019 12  1 - 33 U/L Final   • Hemoglobin A1C 12/13/2019 10.70* 4.80 - 5.60 % Final    Comment: Hemoglobin A1C Ranges:  Increased Risk for Diabetes  5.7% to 6.4%  Diabetes                     >= 6.5%  Diabetic Goal                < 7.0%     • Free T4 12/13/2019 1.19  0.93 - 1.70 ng/dL Final   • TSH 12/13/2019 3.090  0.270 - 4.200 uIU/mL Final   • Creatinine, Urine 12/13/2019 62.6  Not Estab. mg/dL Final   • Microalbumin, Urine 12/13/2019 <3.0  Not Estab. ug/mL Final   • Microalbumin/Creatinine Ratio 12/13/2019 <4.8  0.0 - 30.0 mg/g creat Final    Comment:                      Normal:                0.0 -  30.0                       Albuminuria:          31.0 - 300.0                       Clinical albuminuria:       >300.0       Lab Results   Component Value Date    HGBA1C 10.70 (H) 12/13/2019    HGBA1C 9.82 (H) 01/11/2019    HGBA1C 9.5 (H) 04/13/2018     Lab Results   Component Value Date    MICROALBUR <3.0 12/13/2019    CREATININE 0.72 12/13/2019     Imaging Results (Most Recent)     None     "                                Assessment and Plan:    Meredith was seen today for diabetes and hypothyroidism.    Diagnoses and all orders for this visit:    Diabetes mellitus type 1, uncontrolled, with complications (CMS/[facility])    Insulin pump titration    Abnormal weight gain    Hyperinsulinism    Noncompliance with diabetes treatment        Patient is currently using Medtronic 670 G  She is not using the CGM  She is also not checking her Accu-Cheks frequently  Her blood sugars ranging from 100-400  Hemoglobin A1c is 10.7% extremely uncontrolled  I advised the patient to adjust insulin settings to get the blood sugars below 200 until then    I advised the patient regarding the risk of uncontrolled hyperglycemia  Patient verbalized understanding and is willing to try the CGM and get better  I encourage her to discuss with the diabetes pump educators in the next couple weeks  Patient return to follow-up in 3 months.    Servando Pablo MD. FACE    12/27/19      EMR Dragon / transcription disclaimer:     \"Dictated utilizing Dragon dictation\".         "

## 2020-01-27 ENCOUNTER — TELEPHONE (OUTPATIENT)
Dept: ENDOCRINOLOGY | Age: 45
End: 2020-01-27

## 2020-01-27 NOTE — TELEPHONE ENCOUNTER
Pt called stated the pharmacy has sent several request over for he insulin, and have gotten no reply yet. Pt said he b/s have been going up, because she she I completely out of insulin for her pump. Pt said she had to call out for work today due to elevated b/s sugars and no insulin.

## 2020-03-20 DIAGNOSIS — E03.9 HYPOTHYROIDISM, UNSPECIFIED TYPE: ICD-10-CM

## 2020-03-20 DIAGNOSIS — IMO0002 DIABETES MELLITUS TYPE 1, UNCONTROLLED, WITH COMPLICATIONS: Primary | ICD-10-CM

## 2020-03-26 ENCOUNTER — RESULTS ENCOUNTER (OUTPATIENT)
Dept: ENDOCRINOLOGY | Age: 45
End: 2020-03-26

## 2020-03-26 DIAGNOSIS — E03.9 HYPOTHYROIDISM, UNSPECIFIED TYPE: ICD-10-CM

## 2020-03-26 DIAGNOSIS — IMO0002 DIABETES MELLITUS TYPE 1, UNCONTROLLED, WITH COMPLICATIONS: ICD-10-CM

## 2020-03-27 LAB
ALBUMIN SERPL-MCNC: 4.2 G/DL (ref 3.5–5.2)
ALBUMIN/CREAT UR: 4 MG/G CREAT (ref 0–29)
ALBUMIN/GLOB SERPL: 1.4 G/DL
ALP SERPL-CCNC: 71 U/L (ref 39–117)
ALT SERPL-CCNC: 16 U/L (ref 1–33)
AST SERPL-CCNC: 9 U/L (ref 1–32)
BILIRUB SERPL-MCNC: 0.2 MG/DL (ref 0.2–1.2)
BUN SERPL-MCNC: 14 MG/DL (ref 6–20)
BUN/CREAT SERPL: 19.4 (ref 7–25)
CALCIUM SERPL-MCNC: 9.6 MG/DL (ref 8.6–10.5)
CHLORIDE SERPL-SCNC: 101 MMOL/L (ref 98–107)
CO2 SERPL-SCNC: 29 MMOL/L (ref 22–29)
CREAT SERPL-MCNC: 0.72 MG/DL (ref 0.57–1)
CREAT UR-MCNC: 174.1 MG/DL
GLOBULIN SER CALC-MCNC: 3 GM/DL
GLUCOSE SERPL-MCNC: 167 MG/DL (ref 65–99)
HBA1C MFR BLD: 9.8 % (ref 4.8–5.6)
MICROALBUMIN UR-MCNC: 6.7 UG/ML
POTASSIUM SERPL-SCNC: 4.2 MMOL/L (ref 3.5–5.2)
PROT SERPL-MCNC: 7.2 G/DL (ref 6–8.5)
SODIUM SERPL-SCNC: 139 MMOL/L (ref 136–145)
T4 FREE SERPL-MCNC: 1.21 NG/DL (ref 0.93–1.7)
TSH SERPL DL<=0.005 MIU/L-ACNC: 3.47 UIU/ML (ref 0.27–4.2)

## 2020-04-10 ENCOUNTER — OFFICE VISIT (OUTPATIENT)
Dept: ENDOCRINOLOGY | Age: 45
End: 2020-04-10

## 2020-04-10 ENCOUNTER — RESULTS ENCOUNTER (OUTPATIENT)
Dept: ENDOCRINOLOGY | Age: 45
End: 2020-04-10

## 2020-04-10 DIAGNOSIS — IMO0002 DIABETES MELLITUS TYPE 1, UNCONTROLLED, WITH COMPLICATIONS: ICD-10-CM

## 2020-04-10 DIAGNOSIS — R63.5 ABNORMAL WEIGHT GAIN: ICD-10-CM

## 2020-04-10 DIAGNOSIS — E16.1 HYPERINSULINISM: ICD-10-CM

## 2020-04-10 DIAGNOSIS — IMO0002 DIABETES MELLITUS TYPE 1, UNCONTROLLED, WITH COMPLICATIONS: Primary | ICD-10-CM

## 2020-04-10 DIAGNOSIS — Z46.81 INSULIN PUMP TITRATION: ICD-10-CM

## 2020-04-10 PROCEDURE — 99214 OFFICE O/P EST MOD 30 MIN: CPT | Performed by: INTERNAL MEDICINE

## 2020-04-10 NOTE — PROGRESS NOTES
To whom it may concern    Meredith Gibson SHRUTHI 1975  is a type I diabetic on insulin therapy and is currently under my care    She is under very high risk for exposure and infection with COVID-19  And I strongly recommend that she work from home if possible and if not to go on short-term disability to avoid exposure and infection    Feel free to call me if you have any further questions

## 2020-04-10 NOTE — PROGRESS NOTES
44 y.o.    Patient Care Team:  Ray Barnes DO as PCP - General    Chief Complaint:    Uncontrolled type 1 diabetes mellitus  Subjective     HPI   Patient is a 44-year-old white female with history of uncontrolled type 1 diabetes mellitus came for follow-up    Uncontrolled type 1 diabetes mellitus  Patient is currently using Medtronic 670 G  She is not using CGM which is too expensive for her  She reports her blood sugars are still in the 100-200 range  She still going to work and is at risk for viral infection  Hypoglycemia  Denies any recent episodes  Patient denies any knowledge of diabetic neuropathy or retinopathy or nephropathy.  Abnormal weight  Patient continues to weigh heavy and has not been able to lose weight  Hypothyroidism    Patient denies any symptoms of hyper or hypothyroidism      Interval History      The following portions of the patient's history were reviewed and updated as appropriate: allergies, current medications, past family history, past medical history, past social history, past surgical history and problem list.    Past Medical History:   Diagnosis Date   • Diabetes mellitus (CMS/HCC)    • Hypoglycemia    • Insulin pump status    • Thyroiditis    • Type 1 diabetes mellitus (CMS/HCC)      History reviewed. No pertinent family history.  Social History     Socioeconomic History   • Marital status:      Spouse name: Not on file   • Number of children: Not on file   • Years of education: Not on file   • Highest education level: Not on file   Tobacco Use   • Smoking status: Current Every Day Smoker   • Smokeless tobacco: Never Used   Substance and Sexual Activity   • Alcohol use: Yes     Comment: social     No Known Allergies    Current Outpatient Medications:   •  Blood Glucose Monitoring Suppl (PRATIMA CONTOUR NEXT EZ) W/DEVICE kit, , Disp: , Rfl:   •  escitalopram (LEXAPRO) 20 MG tablet, Take 20 mg by mouth., Disp: , Rfl:   •  glucose blood (Contour Next Test) test strip, Use as  "instructed, Disp: 300 each, Rfl: 4  •  insulin aspart (NovoLOG) 100 UNIT/ML injection, Inject 120 units daily subcutaneous via insulin pump, Disp: 40 each, Rfl: 5  •  Insulin Syringe-Needle U-100 (BD INSULIN SYRINGE U/F) 31G X 5/16\" 0.5 ML misc, Use as directed, Disp: 100 each, Rfl: 1        Review of Systems   Constitutional: Positive for fatigue.   Respiratory: Negative.    Cardiovascular: Negative.    Gastrointestinal: Negative.    All other systems reviewed and are negative.      Objective       There were no vitals filed for this visit.  There is no height or weight on file to calculate BMI.  Vital signs and physical examination not documented due to telephone visit    Physical Exam  Results Review:     I reviewed the patient's new clinical results.    Medical records reviewed  Summary:      Orders Only on 03/26/2020   Component Date Value Ref Range Status   • Glucose 03/26/2020 167* 65 - 99 mg/dL Final   • BUN 03/26/2020 14  6 - 20 mg/dL Final   • Creatinine 03/26/2020 0.72  0.57 - 1.00 mg/dL Final   • eGFR Non African Am 03/26/2020 88  >60 mL/min/1.73 Final   • eGFR African Am 03/26/2020 107  >60 mL/min/1.73 Final   • BUN/Creatinine Ratio 03/26/2020 19.4  7.0 - 25.0 Final   • Sodium 03/26/2020 139  136 - 145 mmol/L Final   • Potassium 03/26/2020 4.2  3.5 - 5.2 mmol/L Final   • Chloride 03/26/2020 101  98 - 107 mmol/L Final   • Total CO2 03/26/2020 29.0  22.0 - 29.0 mmol/L Final   • Calcium 03/26/2020 9.6  8.6 - 10.5 mg/dL Final   • Total Protein 03/26/2020 7.2  6.0 - 8.5 g/dL Final   • Albumin 03/26/2020 4.20  3.50 - 5.20 g/dL Final   • Globulin 03/26/2020 3.0  gm/dL Final   • A/G Ratio 03/26/2020 1.4  g/dL Final   • Total Bilirubin 03/26/2020 0.2  0.2 - 1.2 mg/dL Final   • Alkaline Phosphatase 03/26/2020 71  39 - 117 U/L Final   • AST (SGOT) 03/26/2020 9  1 - 32 U/L Final   • ALT (SGPT) 03/26/2020 16  1 - 33 U/L Final   • Creatinine, Urine 03/26/2020 174.1  Not Estab. mg/dL Final   • Microalbumin, Urine " 03/26/2020 6.7  Not Estab. ug/mL Final   • Microalbumin/Creatinine Ratio 03/26/2020 4  0 - 29 mg/g creat Final    Comment:                        Normal:                0 -  29                         Moderately increased: 30 - 300                         Severely increased:       >300                **Please note reference interval change**     • Hemoglobin A1C 03/26/2020 9.80* 4.80 - 5.60 % Final    Comment: Hemoglobin A1C Ranges:  Increased Risk for Diabetes  5.7% to 6.4%  Diabetes                     >= 6.5%  Diabetic Goal                < 7.0%     • Free T4 03/26/2020 1.21  0.93 - 1.70 ng/dL Final    Results may be falsely increased if patient taking Biotin.   • TSH 03/26/2020 3.470  0.270 - 4.200 uIU/mL Final     Lab Results   Component Value Date    HGBA1C 9.80 (H) 03/26/2020    HGBA1C 10.70 (H) 12/13/2019    HGBA1C 9.82 (H) 01/11/2019     Lab Results   Component Value Date    MICROALBUR 6.7 03/26/2020    CREATININE 0.72 03/26/2020     Imaging Results (Most Recent)     None                Assessment and Plan:    Diagnoses and all orders for this visit:    Diabetes mellitus type 1, uncontrolled, with complications (CMS/[facility])  -     Comprehensive Metabolic Panel; Future  -     Hemoglobin A1c; Future  -     Microalbumin / Creatinine Urine Ratio - Urine, Clean Catch; Future  -     TSH; Future  -     T4, Free; Future    Insulin pump titration    Abnormal weight gain    Hyperinsulinism    Other orders  -     glucose blood (Contour Next Test) test strip; Use as instructed  -     insulin aspart (NovoLOG) 100 UNIT/ML injection; Inject 120 units daily subcutaneous via insulin pump        Patient needs to upload her pump  She reports that she is walking frequently and occasionally experiencing hypoglycemia  She needs her pump settings adjusted  Patient is currently working and is not able to work from home  Because of her increased risk I believe she should be working from home and may provide her a letter of  "recommendation    Her recent  A1c is 9.8% still  Advised the patient to keep in touch with Es [employer] pump educator  Patient return to follow-up in 3 months with lab tests  Servando Pablo MD. FACE    04/10/20      EMR Dragon / transcription disclaimer:     \"Dictated utilizing Dragon dictation\".         "

## 2020-04-15 ENCOUNTER — RESULTS ENCOUNTER (OUTPATIENT)
Dept: ENDOCRINOLOGY | Age: 45
End: 2020-04-15

## 2020-04-15 DIAGNOSIS — IMO0002 DIABETES MELLITUS TYPE 1, UNCONTROLLED, WITH COMPLICATIONS: ICD-10-CM

## 2020-05-29 ENCOUNTER — RESULTS ENCOUNTER (OUTPATIENT)
Dept: ENDOCRINOLOGY | Age: 45
End: 2020-05-29

## 2020-05-29 DIAGNOSIS — IMO0002 DIABETES MELLITUS TYPE 1, UNCONTROLLED, WITH COMPLICATIONS: ICD-10-CM

## 2020-07-13 ENCOUNTER — TELEPHONE (OUTPATIENT)
Dept: ENDOCRINOLOGY | Age: 45
End: 2020-07-13

## 2020-07-13 NOTE — TELEPHONE ENCOUNTER
Pt rec'd a call from her employer that she would have to return back to work or go on disability please advise of what  she needs to do

## 2020-07-14 NOTE — TELEPHONE ENCOUNTER
PT STATES SHE NEEDS WORK STATEMENT STATING SHE NEEDS TO WEAR PROTECTIVE EQUIPMENT AND MAY RETURN NEEDS TO SAY SHE CAN RETURN ON 07/20 FAX# [phone]

## 2020-07-14 NOTE — TELEPHONE ENCOUNTER
Since the [facility] is asking everybody to return to work with a mask I think she should be able to return to work with a mask and other personal precautions

## 2020-07-15 ENCOUNTER — TELEPHONE (OUTPATIENT)
Dept: ENDOCRINOLOGY | Age: 45
End: 2020-07-15

## 2020-07-15 NOTE — TELEPHONE ENCOUNTER
To whom it may concern    Meredith ariadna is okay to return to work with mask, gloves and a social distancing as recommended by CDC for employees returning to work

## 2020-10-15 ENCOUNTER — OFFICE VISIT (OUTPATIENT)
Dept: ENDOCRINOLOGY | Age: 45
End: 2020-10-15

## 2020-10-15 VITALS
HEIGHT: 68 IN | DIASTOLIC BLOOD PRESSURE: 96 MMHG | SYSTOLIC BLOOD PRESSURE: 120 MMHG | RESPIRATION RATE: 16 BRPM | BODY MASS INDEX: 30.25 KG/M2 | WEIGHT: 199.6 LBS

## 2020-10-15 DIAGNOSIS — IMO0002 DIABETES MELLITUS TYPE 1, UNCONTROLLED, WITH COMPLICATIONS: Primary | ICD-10-CM

## 2020-10-15 DIAGNOSIS — E03.9 HYPOTHYROIDISM, UNSPECIFIED TYPE: ICD-10-CM

## 2020-10-15 PROCEDURE — 99214 OFFICE O/P EST MOD 30 MIN: CPT | Performed by: INTERNAL MEDICINE

## 2020-10-15 NOTE — PROGRESS NOTES
Name:  Meredith Gibson  MRN:  [id number]  1975  45 y.o.  female        Follow-up of type 1 diabetes mellitus and hypothyroidism    HPI  Patient is a 44-year-old white female with past medical history of uncontrolled type 1 diabetes mellitus, hypothyroidism who is here for follow-up of hypothyroidism and type 1 diabetes mellitus.  Patient is new to me.  She previously saw Dr. Pablo and was last seen on 4/10/2020.      Type 1 diabetes mellitus  History of type 1 diabetes mellitus diagnosed at age 34 after an episode of DKA.  Most recent labs done 3/26/2020 showed A1c 9.8% and microalbuminuria 4.  A1c was previously 10.7% on 12/13/2019.  Uncontrolled type 1 diabetes mellitus  She was started on insulin pump a few months after diagnosis.    Patient is currently using Medtronic 670 G for 3 to 4 years  She is not using CGM which is too expensive for her  Patient she is here with glucometer which could not be downloaded but we were able to review 2 weeks glucometer readings.  Patient checking blood glucose once daily at different times of the day.    She sometimes checks 2 or 3 times if her blood sugar is low or high.    Blood glucose has been ranging between 99 to 600 mg/dl.  She had few hypoglycemic episodes on 10/1/2020 with blood glucose in the range of 49-56 mg/dl in the afternoon and around 8:54 pm.  She also had hypoglycemia yesterday with blood glucose of 68 mg/dl around 9 PM but did not feel it.    Patient states she starts feeling her low blood sugar when her blood glucose is less than 60 mg/dl.  Hypoglycemia symptoms include shakiness and sweating.  She is not currently doing diabetic diet.  Keeps her carbs around 45 g per meal.  She works 30 to 45 minutes, 3 days a week.  Claimed that her blood glucose sometimes drop while she is working.  She is complaining of difficulty with losing weight.    Her weight was 197 pounds in December 2019 and currently 199 pounds today.  Patient denies any knowledge of  diabetic neuropathy or retinopathy or nephropathy.  Last eye visit was 1 year ago and was negative for retinopathy at that time.    She is planning to set up follow-up visit with the ophthalmologist  She denies polydipsia, blurring of vision, numbness and tingling of her feet.  Patient admits to polyuria.    Insulin pump setting downloaded and reviewed for the last 2 weeks:  Average blood glucose: 264+/-123  Total daily dose: 42 units  Bolus per day: 5 U  Basal and mild: 37 units  Set change: Never  Reservoir change: 2.6 days carbs entered per day: 24±9 g    Active insulin time: 3 hours  Basal rates (39.15 units/day  12 AM: 1.35 U/Hr  6 AM:   1.10 U/Hr  9 AM:    2.05 U/Hr  3pm:     1.70 U/Hr  9pm:     1.75 U/Hr    Carb ratio (g/U)  12 AM:  8.0  4pm:     7.0    ISF mg/dl per U)  12am:  120  12PM: 120    Blood glucose target: 110-130 mg/dl    Interpretation of finding: Patient stated that she is changing her sets every 3 days but I am not sure why the pump its same she has never changed her set.  Patient not entering most of her carbs into pump and does not bolus most times.      Hypothyroidism  She denies symptoms of hypothyroidism and hypothyroidism.  She was previously on levothyroxine when she was initially diagnosed of hypothyroidism about the time she was diagnosed of diabetes mellitus for some months.  She has been off medication for several years except during when she was pregnant.  Last pregnancy was in 2013.  Last labs done 3/26/2020 showed TSH 3.47 uIU/ml and free T4 1.2 ng/dl-normal    Last menstrual period was 2 weeks ago.  Menses are regular every 30 days and lasting for 3 days claims to get some hot flashes a few days before her.    She is complaining of chronic epigastric pain for several years      Past Medical History:   Diagnosis Date   • Diabetes mellitus (CMS/[facility])    • Hypoglycemia    • Insulin pump status    • Thyroiditis    • Type 1 diabetes mellitus (CMS/[facility])        History reviewed. No  "pertinent family history.    Social History     Socioeconomic History   • Marital status:      Spouse name: Not on file   • Number of children: Not on file   • Years of education: Not on file   • Highest education level: Not on file   Tobacco Use   • Smoking status: Current Every Day Smoker   • Smokeless tobacco: Never Used   Substance and Sexual Activity   • Alcohol use: Yes     Comment: social       No Known Allergies      Current Outpatient Medications:   •  Blood Glucose Monitoring Suppl (PRATIMA CONTOUR NEXT EZ) W/DEVICE kit, , Disp: , Rfl:   •  escitalopram (LEXAPRO) 20 MG tablet, Take 20 mg by mouth., Disp: , Rfl:   •  insulin aspart (NovoLOG) 100 UNIT/ML injection, Inject 120 units daily subcutaneous via insulin pump, Disp: 40 each, Rfl: 5  •  Insulin Syringe-Needle U-100 (BD INSULIN SYRINGE U/F) 31G X 5/16\" 0.5 ML misc, Use as directed, Disp: 100 each, Rfl: 1     Review of System    Constitutional: Negative for appetite change, chills, diaphoresis, fatigue, fever and unexpected weight change.   HENT: Negative for congestion, facial swelling and voice change.    Eyes: Negative.    Respiratory: Negative for cough, shortness of breath and wheezing.    Cardiovascular: Negative for chest pain, palpitations and leg swelling.   Gastrointestinal: Negative for abdominal pain, constipation, diarrhea, nausea and vomiting.   Endocrine: Negative for cold intolerance and heat intolerance. Positive for polydipsia and polyuria.   Genitourinary: Negative.    Musculoskeletal: Negative.    Skin: Negative.    Neurological: Negative.    Hematological: Negative.    Psychiatric/Behavioral: Negative.      All other systems negative except as stated above and in HPI     Objective:    PHYSICAL EXAM  /96   Resp 16   Ht 172.7 cm (68\")   Wt 90.5 kg (199 lb 9.6 oz)   BMI 30.35 kg/m²       Vitals signs reviewed.     Constitutional:  No acute distress.  HEENT: AT/NC, GÓMEZ, EOMI, Moist mucous membrane  Neck: Trachea " midline, supple, none tender, thyroid normal dimension without thyromegaly. No thyroid nodules palpated.  Cardiovascular: Heart sounds 1 and 2 only, RRR, no murmurs, no peripheral edema, peripheral pulses present and normal  Chest/Pulmonary: No chest wall tenderness, respiratory effort is normal. Not in any respiratory distress. Good air entry B/L, normal breath sounds and air entry bilaterally. No wheezing or rhonchi.   Abdominal: Abdomen is protuberant, bowel sounds are normal, soft, not tender, no distension and no organomegaly   Musculoskeletal: Normal range of motion, no peripheral edema B/L and Dorsalis pedis pulses present B/L  Skin: warm and dry. No rash seen  Neurological: AAO x3.No focal deficit present. Cranial nerves are intact. Motor and sensory function intact. DTR 2+  Psychiatric: Calm and cooperative. Normal mood and affect. Good judgement       Results Review:    I have reviewed patient's relevant labs and investigation(s)    Orders Only on 03/26/2020   Component Date Value Ref Range Status   • Glucose 03/26/2020 167* 65 - 99 mg/dL Final   • BUN 03/26/2020 14  6 - 20 mg/dL Final   • Creatinine 03/26/2020 0.72  0.57 - 1.00 mg/dL Final   • eGFR Non African Am 03/26/2020 88  >60 mL/min/1.73 Final   • eGFR African Am 03/26/2020 107  >60 mL/min/1.73 Final   • BUN/Creatinine Ratio 03/26/2020 19.4  7.0 - 25.0 Final   • Sodium 03/26/2020 139  136 - 145 mmol/L Final   • Potassium 03/26/2020 4.2  3.5 - 5.2 mmol/L Final   • Chloride 03/26/2020 101  98 - 107 mmol/L Final   • Total CO2 03/26/2020 29.0  22.0 - 29.0 mmol/L Final   • Calcium 03/26/2020 9.6  8.6 - 10.5 mg/dL Final   • Total Protein 03/26/2020 7.2  6.0 - 8.5 g/dL Final   • Albumin 03/26/2020 4.20  3.50 - 5.20 g/dL Final   • Globulin 03/26/2020 3.0  gm/dL Final   • A/G Ratio 03/26/2020 1.4  g/dL Final   • Total Bilirubin 03/26/2020 0.2  0.2 - 1.2 mg/dL Final   • Alkaline Phosphatase 03/26/2020 71  39 - 117 U/L Final   • AST (SGOT) 03/26/2020 9  1 - 32  "U/L Final   • ALT (SGPT) 03/26/2020 16  1 - 33 U/L Final   • Creatinine, Urine 03/26/2020 174.1  Not Estab. mg/dL Final   • Microalbumin, Urine 03/26/2020 6.7  Not Estab. ug/mL Final   • Microalbumin/Creatinine Ratio 03/26/2020 4  0 - 29 mg/g creat Final    Comment:                        Normal:                0 -  29                         Moderately increased: 30 - 300                         Severely increased:       >300                **Please note reference interval change**     • Hemoglobin A1C 03/26/2020 9.80* 4.80 - 5.60 % Final    Comment: Hemoglobin A1C Ranges:  Increased Risk for Diabetes  5.7% to 6.4%  Diabetes                     >= 6.5%  Diabetic Goal                < 7.0%     • Free T4 03/26/2020 1.21  0.93 - 1.70 ng/dL Final    Results may be falsely increased if patient taking Biotin.   • TSH 03/26/2020 3.470  0.270 - 4.200 uIU/mL Final         Assessment/plan   Type 1 diabetes mellitus-uncontrolled.  Complicated by hypoglycemia .  Denies other complications  Not currently checking blood glucose regularly and not bolusing for her carbs  Hypoglycemic episodes every now and then with exercise    Plan  Check hemoglobin A1c and microalbumin/creatinine ratio  Advised to put a 50% temporary basal for about 2 hours when exercising  Counseled to start entering carbs into pump and to bolusing for her meals and snacks  Advised to check blood glucose AC and at bedtime as well as as needed  Counseled on diet and exercise  Will refer to certified diabetic educator for education and review of pump options  Will continue current pump settings without any adjustments      Hypothyroidism-currently euthyroid    Plan  Check TSH and free T4 today      Obesity stage I: BMI 30.35 kg/m² today    Plan  Counseled on diet and exercise      Thank you for allowing me to participate in your care.    .loida Hernadez MD  [facility]  10/15/20    EMR Dragon / transcription disclaimer:     \"Dictated " "utilizing Dragon dictation\".   EMR Dragon / transcription disclaimer:    Much of this encounter note is an electronic transcription/ translation of spoken language to printed text.  Electronic translation of spoken language may permit erroneous, or at times, nonsensical words or phrases to be inadvertently transcribed; although I have reviewed the note for such errors, some may still exist.          "

## 2020-10-15 NOTE — PATIENT INSTRUCTIONS
1. Advised to bolus insulin with each meal and when snacking  2. Work on diets and exercising  3. Check blood glucose before each meals, at bedtime and when necessary  4. See certified diabetic education to discuss about diabetes and different pumps and CGM options

## 2020-10-16 ENCOUNTER — TELEPHONE (OUTPATIENT)
Dept: ENDOCRINOLOGY | Age: 45
End: 2020-10-16

## 2020-10-16 LAB
ALBUMIN/CREAT UR: 7 MG/G CREAT (ref 0–29)
CREAT UR-MCNC: 113.5 MG/DL
HBA1C MFR BLD: 9 % (ref 4.8–5.6)
MICROALBUMIN UR-MCNC: 7.5 UG/ML
T4 FREE SERPL-MCNC: 1.21 NG/DL (ref 0.93–1.7)
TSH SERPL DL<=0.005 MIU/L-ACNC: 2.83 UIU/ML (ref 0.27–4.2)

## 2025-04-02 NOTE — TELEPHONE ENCOUNTER
[facility] [place] [facility] [phone]  sent Rx request for the following:      Requested Prescriptions   Pending Prescriptions Disp Refills    lisinopril (ZESTRIL) 2.5 MG tablet [Pharmacy Med Name: LISINOPRIL 2.5MG TAB] 90 tablet 0     Sig: TAKE ONE TABLET BY MOUTH EVERY DAY AT BEDTIME       ACE Inhibitors (Including Combos) Protocol Passed - 4/2/2025  3:03 PM         Last Prescription Date:   1/16/2025  Last Fill Qty/Refills:         90, R-0    Last Office Visit:              10/22/2024   Future Office visit:           none     Prescription approved per [facility] Refill Protocol.  Matteo Sam RN on 4/2/2025 at 3:09 PM      simvastatin (ZOCOR) 20 MG tablet [Pharmacy Med Name: SIMVASTATIN 20MG TAB] 90 tablet 0     Sig: TAKE ONE TABLET BY MOUTH EVERY DAY AT BEDTIME       Antihyperlipidemic agents Passed - 4/2/2025  3:03 PM        Last Prescription Date:   1/16/2025  Last Fill Qty/Refills:         90, R-0    Last Office Visit:              10/22/2024   Future Office visit:           none       Prescription approved per [facility] Refill Protocol.  Matteo Sam RN on 4/2/2025 at 3:08 PM    Next 5 appointments (look out 90 days)      May 06, 2025 10:30 AM  Return Visit with Reynaldo Hansen MD  [facility] ([facility] [address]  [facility] [address]  [phone]     Jun 02, 2025 1:15 PM  (Arrive by 1:00 PM)  Return Visit with Polina De Leon NP  [facility] ([facility]) [address]  [place] [facility] [address]  [phone]                Pt calling back again on needing a return to work statement with MD approval to go back with mask gloves and social distancing needs to have done by omid